# Patient Record
Sex: MALE | Race: BLACK OR AFRICAN AMERICAN | NOT HISPANIC OR LATINO
[De-identification: names, ages, dates, MRNs, and addresses within clinical notes are randomized per-mention and may not be internally consistent; named-entity substitution may affect disease eponyms.]

---

## 2017-01-18 ENCOUNTER — CHARTING TRANS (OUTPATIENT)
Dept: OTHER | Age: 57
End: 2017-01-18

## 2017-01-24 ENCOUNTER — HOSPITAL (OUTPATIENT)
Dept: OTHER | Age: 57
End: 2017-01-24
Attending: SPECIALIST

## 2017-01-24 LAB
ALBUMIN SERPL-MCNC: 3.4 GM/DL (ref 3.6–5.1)
ALP SERPL-CCNC: 138 UNIT/L (ref 45–117)
ALT SERPL-CCNC: 29 UNIT/L
ANALYZER ANC (IANC): ABNORMAL
ANION GAP SERPL CALC-SCNC: 10 MMOL/L (ref 10–20)
AST SERPL-CCNC: 17 UNIT/L
BASOPHILS # BLD: 0 THOUSAND/MCL (ref 0–0.3)
BASOPHILS NFR BLD: 0 %
BILIRUB CONJ SERPL-MCNC: 0.1 MG/DL (ref 0–0.2)
BILIRUB SERPL-MCNC: 0.2 MG/DL (ref 0.2–1)
BUN SERPL-MCNC: 14 MG/DL (ref 10–20)
BUN/CREAT SERPL: 15 (ref 7–25)
CALCIUM SERPL-MCNC: 8.6 MG/DL (ref 8.4–10.2)
CHLORIDE: 104 MMOL/L (ref 98–107)
CO2 SERPL-SCNC: 27 MMOL/L (ref 21–32)
CREAT SERPL-MCNC: 0.94 MG/DL (ref 0.67–1.17)
DIFFERENTIAL METHOD BLD: ABNORMAL
EOSINOPHIL # BLD: 0.3 THOUSAND/MCL (ref 0.1–0.5)
EOSINOPHIL NFR BLD: 4 %
ERYTHROCYTE [DISTWIDTH] IN BLOOD: 14.4 % (ref 11–15)
GLUCOSE SERPL-MCNC: 109 MG/DL (ref 65–99)
GLYCOHEMOGLOBIN: 7.4 % (ref 4.5–5.6)
HEMATOCRIT: 38.8 % (ref 39–51)
HGB BLD-MCNC: 12.5 GM/DL (ref 13–17)
LYMPHOCYTES # BLD: 3.3 THOUSAND/MCL (ref 1–4)
LYMPHOCYTES NFR BLD: 40 %
MAGNESIUM SERPL-MCNC: 1.9 MG/DL (ref 1.7–2.4)
MCH RBC QN AUTO: 26.6 PG (ref 26–34)
MCHC RBC AUTO-ENTMCNC: 32.2 GM/DL (ref 32–36.5)
MCV RBC AUTO: 82.6 FL (ref 78–100)
MONOCYTES # BLD: 0.8 THOUSAND/MCL (ref 0.3–0.9)
MONOCYTES NFR BLD: 10 %
NEUTROPHILS # BLD: 3.8 THOUSAND/MCL (ref 1.8–7.7)
NEUTROPHILS NFR BLD: 46 %
NEUTS SEG NFR BLD: ABNORMAL %
PERCENT NRBC: ABNORMAL
PLATELET # BLD: 258 THOUSAND/MCL (ref 140–450)
POTASSIUM SERPL-SCNC: 4.3 MMOL/L (ref 3.4–5.1)
PROT SERPL-MCNC: 7 GM/DL (ref 6.4–8.2)
RBC # BLD: 4.7 MILLION/MCL (ref 4.5–5.9)
SODIUM SERPL-SCNC: 137 MMOL/L (ref 135–145)
TSH SERPL-ACNC: 2.92 MCUNIT/ML (ref 0.35–5)
WBC # BLD: 8.3 THOUSAND/MCL (ref 4.2–11)

## 2017-02-01 ENCOUNTER — HOSPITAL (OUTPATIENT)
Dept: OTHER | Age: 57
End: 2017-02-01
Attending: SPECIALIST

## 2017-03-01 ENCOUNTER — HOSPITAL (OUTPATIENT)
Dept: OTHER | Age: 57
End: 2017-03-01
Attending: SPECIALIST

## 2017-03-10 LAB
ANION GAP SERPL CALC-SCNC: 8 MMOL/L (ref 10–20)
BUN SERPL-MCNC: 15 MG/DL (ref 6–20)
BUN/CREAT SERPL: 16 (ref 7–25)
CALCIUM SERPL-MCNC: 8.9 MG/DL (ref 8.4–10.2)
CHLORIDE: 103 MMOL/L (ref 98–107)
CO2 SERPL-SCNC: 29 MMOL/L (ref 21–32)
CREAT SERPL-MCNC: 0.92 MG/DL (ref 0.67–1.17)
GLUCOSE SERPL-MCNC: 220 MG/DL (ref 65–99)
MAGNESIUM SERPL-MCNC: 2 MG/DL (ref 1.7–2.4)
POTASSIUM SERPL-SCNC: 3.9 MMOL/L (ref 3.4–5.1)
SODIUM SERPL-SCNC: 136 MMOL/L (ref 135–145)

## 2017-04-01 ENCOUNTER — HOSPITAL (OUTPATIENT)
Dept: OTHER | Age: 57
End: 2017-04-01
Attending: SPECIALIST

## 2017-04-20 ENCOUNTER — HOSPITAL (OUTPATIENT)
Dept: OTHER | Age: 57
End: 2017-04-20
Attending: SPECIALIST

## 2017-04-20 LAB
ALBUMIN SERPL-MCNC: 3.6 GM/DL (ref 3.6–5.1)
ALBUMIN/GLOB SERPL: 0.8 {RATIO} (ref 1–2.4)
ALP SERPL-CCNC: 189 UNIT/L (ref 45–117)
ALT SERPL-CCNC: 34 UNIT/L
ANALYZER ANC (IANC): NORMAL
ANION GAP SERPL CALC-SCNC: 13 MMOL/L (ref 10–20)
APPEARANCE UR: CLEAR
AST SERPL-CCNC: 17 UNIT/L
B-OH-BUTYR SERPL-SCNC: 0.3 MMOL/L (ref 0–0.3)
BACTERIA #/AREA URNS HPF: ABNORMAL /HPF
BASOPHILS # BLD: 0 THOUSAND/MCL (ref 0–0.3)
BASOPHILS NFR BLD: 0 %
BILIRUB SERPL-MCNC: 0.3 MG/DL (ref 0.2–1)
BILIRUB UR QL: NEGATIVE
BNP SERPL-MCNC: 9 PG/ML
BUN SERPL-MCNC: 28 MG/DL (ref 6–20)
BUN/CREAT SERPL: 23 (ref 7–25)
CALCIUM SERPL-MCNC: 9.4 MG/DL (ref 8.4–10.2)
CHLORIDE: 96 MMOL/L (ref 98–107)
CO2 SERPL-SCNC: 28 MMOL/L (ref 21–32)
COLOR UR: YELLOW
CREAT SERPL-MCNC: 1.2 MG/DL (ref 0.67–1.17)
DIFFERENTIAL METHOD BLD: NORMAL
EOSINOPHIL # BLD: 0.2 THOUSAND/MCL (ref 0.1–0.5)
EOSINOPHIL NFR BLD: 2 %
ERYTHROCYTE [DISTWIDTH] IN BLOOD: 13.5 % (ref 11–15)
GLOBULIN SER-MCNC: 4.3 GM/DL (ref 2–4)
GLUCOSE BLDC GLUCOMTR-MCNC: 416 MG/DL (ref 65–99)
GLUCOSE BLDC GLUCOMTR-MCNC: 417 MG/DL (ref 65–99)
GLUCOSE SERPL-MCNC: 585 MG/DL (ref 65–99)
GLUCOSE UR-MCNC: >1000 MG/DL
GLYCOHEMOGLOBIN: 10.2 % (ref 4.5–5.6)
HEMATOCRIT: 41.2 % (ref 39–51)
HGB BLD-MCNC: 13.7 GM/DL (ref 13–17)
HGB UR QL: NEGATIVE
HYALINE CASTS #/AREA URNS LPF: ABNORMAL /LPF (ref 0–5)
KETONES UR-MCNC: NEGATIVE MG/DL
LEUKOCYTE ESTERASE UR QL STRIP: NEGATIVE
LYMPHOCYTES # BLD: 2.9 THOUSAND/MCL (ref 1–4)
LYMPHOCYTES NFR BLD: 28 %
MAGNESIUM SERPL-MCNC: 2.4 MG/DL (ref 1.7–2.4)
MCH RBC QN AUTO: 26.8 PG (ref 26–34)
MCHC RBC AUTO-ENTMCNC: 33.3 GM/DL (ref 32–36.5)
MCV RBC AUTO: 80.6 FL (ref 78–100)
MONOCYTES # BLD: 0.7 THOUSAND/MCL (ref 0.3–0.9)
MONOCYTES NFR BLD: 7 %
NEUTROPHILS # BLD: 6.5 THOUSAND/MCL (ref 1.8–7.7)
NEUTROPHILS NFR BLD: 63 %
NEUTS SEG NFR BLD: NORMAL %
NITRITE UR QL: NEGATIVE
OSMOLALITY UR: 556 MOSM/KG (ref 50–1200)
PERCENT NRBC: NORMAL
PH UR: 5.5 UNIT (ref 5–7)
PLATELET # BLD: 253 THOUSAND/MCL (ref 140–450)
POTASSIUM SERPL-SCNC: 4.7 MMOL/L (ref 3.4–5.1)
POTASSIUM UR-SCNC: 13.3 MMOL/L
PROT SERPL-MCNC: 7.9 GM/DL (ref 6.4–8.2)
PROT UR QL: NEGATIVE MG/DL
RBC # BLD: 5.11 MILLION/MCL (ref 4.5–5.9)
RBC #/AREA URNS HPF: ABNORMAL /HPF (ref 0–3)
SODIUM SERPL-SCNC: 132 MMOL/L (ref 135–145)
SODIUM UR-SCNC: 43 MMOL/L
SP GR UR: 1.03 (ref 1–1.03)
SPECIMEN SOURCE: ABNORMAL
SQUAMOUS #/AREA URNS HPF: ABNORMAL /HPF (ref 0–5)
URNS CMNT MICRO: ABNORMAL
UROBILINOGEN UR QL: 0.2 MG/DL (ref 0–1)
WBC # BLD: 10.3 THOUSAND/MCL (ref 4.2–11)
WBC #/AREA URNS HPF: ABNORMAL /HPF (ref 0–5)

## 2017-04-21 LAB
ANION GAP SERPL CALC-SCNC: 13 MMOL/L (ref 10–20)
BUN SERPL-MCNC: 21 MG/DL (ref 6–20)
BUN/CREAT SERPL: 22 (ref 7–25)
CALCIUM SERPL-MCNC: 9 MG/DL (ref 8.4–10.2)
CHLORIDE: 100 MMOL/L (ref 98–107)
CO2 SERPL-SCNC: 28 MMOL/L (ref 21–32)
CREAT SERPL-MCNC: 0.95 MG/DL (ref 0.67–1.17)
GLUCOSE BLDC GLUCOMTR-MCNC: 238 MG/DL (ref 65–99)
GLUCOSE BLDC GLUCOMTR-MCNC: 288 MG/DL (ref 65–99)
GLUCOSE BLDC GLUCOMTR-MCNC: 328 MG/DL (ref 65–99)
GLUCOSE BLDC GLUCOMTR-MCNC: 338 MG/DL (ref 65–99)
GLUCOSE BLDC GLUCOMTR-MCNC: 383 MG/DL (ref 65–99)
GLUCOSE BLDC GLUCOMTR-MCNC: 387 MG/DL (ref 65–99)
GLUCOSE SERPL-MCNC: 352 MG/DL (ref 65–99)
POTASSIUM SERPL-SCNC: 4.6 MMOL/L (ref 3.4–5.1)
SODIUM SERPL-SCNC: 136 MMOL/L (ref 135–145)

## 2017-04-22 LAB
GLUCOSE BLDC GLUCOMTR-MCNC: 180 MG/DL (ref 65–99)
GLUCOSE BLDC GLUCOMTR-MCNC: 239 MG/DL (ref 65–99)
GLUCOSE BLDC GLUCOMTR-MCNC: 318 MG/DL (ref 65–99)

## 2017-04-26 ENCOUNTER — CHARTING TRANS (OUTPATIENT)
Dept: OTHER | Age: 57
End: 2017-04-26

## 2017-04-26 ENCOUNTER — LAB SERVICES (OUTPATIENT)
Dept: OTHER | Age: 57
End: 2017-04-26

## 2017-04-26 LAB — GLUCOSE: 158

## 2017-05-01 ENCOUNTER — HOSPITAL (OUTPATIENT)
Dept: OTHER | Age: 57
End: 2017-05-01
Attending: SPECIALIST

## 2017-05-18 ENCOUNTER — CHARTING TRANS (OUTPATIENT)
Dept: OTHER | Age: 57
End: 2017-05-18

## 2017-05-18 ENCOUNTER — LAB SERVICES (OUTPATIENT)
Dept: OTHER | Age: 57
End: 2017-05-18

## 2017-05-18 LAB
ALBUMIN SERPL-MCNC: 2.9 GM/DL (ref 3.6–5.1)
ALBUMIN/GLOB SERPL: 0.7 {RATIO} (ref 1–2.4)
ALP SERPL-CCNC: 94 UNIT/L (ref 45–117)
ALT SERPL-CCNC: 29 UNIT/L
ANION GAP SERPL CALC-SCNC: 14 MMOL/L (ref 10–20)
AST SERPL-CCNC: 18 UNIT/L
BILIRUB SERPL-MCNC: 0.4 MG/DL (ref 0.2–1)
BUN SERPL-MCNC: 14 MG/DL (ref 6–20)
BUN/CREAT SERPL: 14 (ref 7–25)
CALCIUM SERPL-MCNC: 8.6 MG/DL (ref 8.4–10.2)
CHLORIDE: 106 MMOL/L (ref 98–107)
CO2 SERPL-SCNC: 26 MMOL/L (ref 21–32)
CREAT SERPL-MCNC: 0.97 MG/DL (ref 0.67–1.17)
GLOBULIN SER-MCNC: 4 GM/DL (ref 2–4)
GLUCOSE SERPL-MCNC: 79 MG/DL (ref 65–99)
MAGNESIUM SERPL-MCNC: 2 MG/DL (ref 1.7–2.4)
POTASSIUM SERPL-SCNC: 3.8 MMOL/L (ref 3.4–5.1)
PROT SERPL-MCNC: 6.9 GM/DL (ref 6.4–8.2)
SODIUM SERPL-SCNC: 142 MMOL/L (ref 135–145)
TSH SERPL-ACNC: 0.99 MCUNIT/ML (ref 0.35–5)
TSH SERPL-ACNC: 0.99 MCUNITS/ML (ref 0.35–5)

## 2017-06-01 ENCOUNTER — HOSPITAL (OUTPATIENT)
Dept: OTHER | Age: 57
End: 2017-06-01
Attending: SPECIALIST

## 2017-06-12 ENCOUNTER — LAB SERVICES (OUTPATIENT)
Dept: OTHER | Age: 57
End: 2017-06-12

## 2017-06-12 ENCOUNTER — CHARTING TRANS (OUTPATIENT)
Dept: OTHER | Age: 57
End: 2017-06-12

## 2017-06-12 LAB — GLUCOSE: 105

## 2017-07-01 ENCOUNTER — HOSPITAL (OUTPATIENT)
Dept: OTHER | Age: 57
End: 2017-07-01
Attending: SPECIALIST

## 2017-07-05 LAB
ANALYZER ANC (IANC): ABNORMAL
ANION GAP SERPL CALC-SCNC: 12 MMOL/L (ref 10–20)
BASOPHILS # BLD: 0 THOUSAND/MCL (ref 0–0.3)
BASOPHILS NFR BLD: 0 %
BUN SERPL-MCNC: 20 MG/DL (ref 6–20)
BUN/CREAT SERPL: 16 (ref 7–25)
CALCIUM SERPL-MCNC: 9 MG/DL (ref 8.4–10.2)
CHLORIDE: 109 MMOL/L (ref 98–107)
CO2 SERPL-SCNC: 25 MMOL/L (ref 21–32)
CREAT SERPL-MCNC: 1.27 MG/DL (ref 0.67–1.17)
DIFFERENTIAL METHOD BLD: ABNORMAL
EOSINOPHIL # BLD: 0.2 THOUSAND/MCL (ref 0.1–0.5)
EOSINOPHIL NFR BLD: 2 %
ERYTHROCYTE [DISTWIDTH] IN BLOOD: 14 % (ref 11–15)
GLUCOSE SERPL-MCNC: 113 MG/DL (ref 65–99)
HEMATOCRIT: 38.3 % (ref 39–51)
HGB BLD-MCNC: 12.2 GM/DL (ref 13–17)
LYMPHOCYTES # BLD: 2.4 THOUSAND/MCL (ref 1–4)
LYMPHOCYTES NFR BLD: 33 %
MAGNESIUM SERPL-MCNC: 2.1 MG/DL (ref 1.7–2.4)
MCH RBC QN AUTO: 25.8 PG (ref 26–34)
MCHC RBC AUTO-ENTMCNC: 31.9 GM/DL (ref 32–36.5)
MCV RBC AUTO: 81 FL (ref 78–100)
MONOCYTES # BLD: 0.7 THOUSAND/MCL (ref 0.3–0.9)
MONOCYTES NFR BLD: 9 %
NEUTROPHILS # BLD: 4.1 THOUSAND/MCL (ref 1.8–7.7)
NEUTROPHILS NFR BLD: 56 %
NEUTS SEG NFR BLD: ABNORMAL %
PERCENT NRBC: ABNORMAL
PLATELET # BLD: 232 THOUSAND/MCL (ref 140–450)
POTASSIUM SERPL-SCNC: 3.9 MMOL/L (ref 3.4–5.1)
RBC # BLD: 4.73 MILLION/MCL (ref 4.5–5.9)
SODIUM SERPL-SCNC: 142 MMOL/L (ref 135–145)
WBC # BLD: 7.4 THOUSAND/MCL (ref 4.2–11)

## 2018-02-22 ENCOUNTER — HOSPITAL ENCOUNTER (OUTPATIENT)
Dept: GENERAL RADIOLOGY | Age: 58
Discharge: HOME | End: 2018-02-22
Attending: FAMILY MEDICINE
Payer: COMMERCIAL

## 2018-02-22 ENCOUNTER — OFFICE VISIT (OUTPATIENT)
Dept: FAMILY MEDICINE CLINIC | Facility: CLINIC | Age: 58
End: 2018-02-22

## 2018-02-22 ENCOUNTER — LAB ENCOUNTER (OUTPATIENT)
Dept: LAB | Age: 58
End: 2018-02-22
Attending: FAMILY MEDICINE
Payer: COMMERCIAL

## 2018-02-22 VITALS
TEMPERATURE: 99 F | WEIGHT: 315 LBS | HEART RATE: 59 BPM | BODY MASS INDEX: 40.43 KG/M2 | DIASTOLIC BLOOD PRESSURE: 84 MMHG | HEIGHT: 74 IN | RESPIRATION RATE: 16 BRPM | SYSTOLIC BLOOD PRESSURE: 144 MMHG

## 2018-02-22 DIAGNOSIS — Z00.00 ROUTINE PHYSICAL EXAMINATION: ICD-10-CM

## 2018-02-22 DIAGNOSIS — Z13.29 THYROID DISORDER SCREEN: ICD-10-CM

## 2018-02-22 DIAGNOSIS — E66.01 MORBID OBESITY WITH BMI OF 40.0-44.9, ADULT (HCC): ICD-10-CM

## 2018-02-22 DIAGNOSIS — M54.50 CHRONIC LEFT-SIDED LOW BACK PAIN WITHOUT SCIATICA: ICD-10-CM

## 2018-02-22 DIAGNOSIS — Z13.220 LIPID SCREENING: ICD-10-CM

## 2018-02-22 DIAGNOSIS — G89.29 CHRONIC LEFT-SIDED LOW BACK PAIN WITHOUT SCIATICA: ICD-10-CM

## 2018-02-22 DIAGNOSIS — I42.9 CARDIOMYOPATHY, UNSPECIFIED TYPE (HCC): ICD-10-CM

## 2018-02-22 DIAGNOSIS — I10 ESSENTIAL HYPERTENSION: ICD-10-CM

## 2018-02-22 DIAGNOSIS — I49.9 CARDIAC ARRHYTHMIA, UNSPECIFIED CARDIAC ARRHYTHMIA TYPE: ICD-10-CM

## 2018-02-22 DIAGNOSIS — R05.9 COUGH: ICD-10-CM

## 2018-02-22 DIAGNOSIS — R10.9 LEFT FLANK PAIN: ICD-10-CM

## 2018-02-22 DIAGNOSIS — Z12.5 PROSTATE CANCER SCREENING: Primary | ICD-10-CM

## 2018-02-22 DIAGNOSIS — Z12.5 PROSTATE CANCER SCREENING: ICD-10-CM

## 2018-02-22 DIAGNOSIS — R10.9 FLANK PAIN: ICD-10-CM

## 2018-02-22 LAB
ALBUMIN SERPL BCP-MCNC: 3.4 G/DL (ref 3.5–4.8)
ALBUMIN/GLOB SERPL: 0.9 {RATIO} (ref 1–2)
ALP SERPL-CCNC: 95 U/L (ref 32–100)
ALT SERPL-CCNC: 30 U/L (ref 17–63)
ANION GAP SERPL CALC-SCNC: 8 MMOL/L (ref 0–18)
AST SERPL-CCNC: 22 U/L (ref 15–41)
BACTERIA UR QL AUTO: NEGATIVE /HPF
BASOPHILS # BLD: 0.1 K/UL (ref 0–0.2)
BASOPHILS NFR BLD: 1 %
BILIRUB SERPL-MCNC: 0.5 MG/DL (ref 0.3–1.2)
BILIRUB UR QL: NEGATIVE
BUN SERPL-MCNC: 10 MG/DL (ref 8–20)
BUN/CREAT SERPL: 9.5 (ref 10–20)
CALCIUM SERPL-MCNC: 9 MG/DL (ref 8.5–10.5)
CHLORIDE SERPL-SCNC: 102 MMOL/L (ref 95–110)
CHOLEST SERPL-MCNC: 162 MG/DL (ref 110–200)
CLARITY UR: CLEAR
CO2 SERPL-SCNC: 26 MMOL/L (ref 22–32)
COLOR UR: YELLOW
CREAT SERPL-MCNC: 1.05 MG/DL (ref 0.5–1.5)
EOSINOPHIL # BLD: 0.2 K/UL (ref 0–0.7)
EOSINOPHIL NFR BLD: 3 %
ERYTHROCYTE [DISTWIDTH] IN BLOOD BY AUTOMATED COUNT: 14.3 % (ref 11–15)
GLOBULIN PLAS-MCNC: 3.7 G/DL (ref 2.5–3.7)
GLUCOSE SERPL-MCNC: 328 MG/DL (ref 70–99)
GLUCOSE UR-MCNC: >=500 MG/DL
HCT VFR BLD AUTO: 39.7 % (ref 41–52)
HDLC SERPL-MCNC: 35 MG/DL
HGB BLD-MCNC: 13 G/DL (ref 13.5–17.5)
HGB UR QL STRIP.AUTO: NEGATIVE
KETONES UR-MCNC: NEGATIVE MG/DL
LDLC SERPL CALC-MCNC: 104 MG/DL (ref 0–99)
LEUKOCYTE ESTERASE UR QL STRIP.AUTO: NEGATIVE
LYMPHOCYTES # BLD: 2.2 K/UL (ref 1–4)
LYMPHOCYTES NFR BLD: 32 %
MCH RBC QN AUTO: 26.8 PG (ref 27–32)
MCHC RBC AUTO-ENTMCNC: 32.7 G/DL (ref 32–37)
MCV RBC AUTO: 81.9 FL (ref 80–100)
MONOCYTES # BLD: 0.7 K/UL (ref 0–1)
MONOCYTES NFR BLD: 10 %
NEUTROPHILS # BLD AUTO: 3.8 K/UL (ref 1.8–7.7)
NEUTROPHILS NFR BLD: 55 %
NITRITE UR QL STRIP.AUTO: NEGATIVE
NONHDLC SERPL-MCNC: 127 MG/DL
OSMOLALITY UR CALC.SUM OF ELEC: 294 MOSM/KG (ref 275–295)
PATIENT FASTING: NO
PH UR: 5 [PH] (ref 5–8)
PLATELET # BLD AUTO: 209 K/UL (ref 140–400)
PMV BLD AUTO: 8.9 FL (ref 7.4–10.3)
POTASSIUM SERPL-SCNC: 4.3 MMOL/L (ref 3.3–5.1)
PROT SERPL-MCNC: 7.1 G/DL (ref 5.9–8.4)
PROT UR-MCNC: NEGATIVE MG/DL
PSA SERPL-MCNC: 5 NG/ML (ref 0–4)
RBC # BLD AUTO: 4.85 M/UL (ref 4.5–5.9)
RBC #/AREA URNS AUTO: <1 /HPF
SODIUM SERPL-SCNC: 136 MMOL/L (ref 136–144)
SP GR UR STRIP: 1.02 (ref 1–1.03)
TRIGL SERPL-MCNC: 117 MG/DL (ref 1–149)
TSH SERPL-ACNC: 0.77 UIU/ML (ref 0.45–5.33)
UROBILINOGEN UR STRIP-ACNC: <2
VIT C UR-MCNC: NEGATIVE MG/DL
WBC # BLD AUTO: 7 K/UL (ref 4–11)
WBC #/AREA URNS AUTO: <1 /HPF

## 2018-02-22 PROCEDURE — 36415 COLL VENOUS BLD VENIPUNCTURE: CPT

## 2018-02-22 PROCEDURE — 99396 PREV VISIT EST AGE 40-64: CPT | Performed by: FAMILY MEDICINE

## 2018-02-22 PROCEDURE — 71046 X-RAY EXAM CHEST 2 VIEWS: CPT | Performed by: FAMILY MEDICINE

## 2018-02-22 PROCEDURE — 84443 ASSAY THYROID STIM HORMONE: CPT

## 2018-02-22 PROCEDURE — 72110 X-RAY EXAM L-2 SPINE 4/>VWS: CPT | Performed by: FAMILY MEDICINE

## 2018-02-22 PROCEDURE — 87086 URINE CULTURE/COLONY COUNT: CPT

## 2018-02-22 PROCEDURE — 80061 LIPID PANEL: CPT

## 2018-02-22 PROCEDURE — 85025 COMPLETE CBC W/AUTO DIFF WBC: CPT

## 2018-02-22 PROCEDURE — 81001 URINALYSIS AUTO W/SCOPE: CPT

## 2018-02-22 PROCEDURE — 99212 OFFICE O/P EST SF 10 MIN: CPT | Performed by: FAMILY MEDICINE

## 2018-02-22 PROCEDURE — 99214 OFFICE O/P EST MOD 30 MIN: CPT | Performed by: FAMILY MEDICINE

## 2018-02-22 PROCEDURE — 80053 COMPREHEN METABOLIC PANEL: CPT

## 2018-02-22 RX ORDER — DIGOXIN 250 MCG
TABLET ORAL
COMMUNITY
Start: 2018-02-18 | End: 2019-09-19

## 2018-02-22 RX ORDER — CARVEDILOL 25 MG/1
25 TABLET ORAL 2 TIMES DAILY
COMMUNITY
Start: 2018-02-18

## 2018-02-22 NOTE — PATIENT INSTRUCTIONS
Monitor blood pressures and record at home. Limit salt intake. Recommend weight loss via daily exercising and consistent healthy dietary changes. Medication reviewed and renewed where needed and appropriate. Comply with medications.   Keep specialist jeny

## 2018-02-23 ENCOUNTER — TELEPHONE (OUTPATIENT)
Dept: FAMILY MEDICINE CLINIC | Facility: CLINIC | Age: 58
End: 2018-02-23

## 2018-02-23 NOTE — TELEPHONE ENCOUNTER
Verified name and . X Cheryle Clara Results reviewed with pt. Pt also asking if Dr Lucy Diehl can review his labs from 18. Please note, pt did indicate he did not fast for labs, per pt he ate a piece of cake a couple hours before lab draw.

## 2018-02-23 NOTE — TELEPHONE ENCOUNTER
----- Message from Leonor Rahman DO sent at 2/22/2018  8:14 PM CST -----  Chest xray is normal but there is multilevel disc and bone degenerative changes. Moderate osteoarthritis in both hips.

## 2018-02-23 NOTE — PROGRESS NOTES
HPI:    Patient ID: Rocio Cruz is a 56year old male.     56year old AA male here for complete preventive care physical and for status update on any confirmed chronic medical illnesses and follow up on any previous labs or procedures that were suggestiv Body mass index is 43.4 kg/m². PHYSICAL EXAM:   Physical Exam    Constitutional: He is oriented to person, place, and time and obese. He appears distressed.    HENT:   Right Ear: Tympanic membrane and ear canal normal.   Left Ear: Tympanic membrane and e CBC W Differential W Platelet [E]      TSH W Reflex To Free T4 [E]      Lipid Panel [E]      Comp Metabolic Panel (14) [E]      PSA (Screening) [E]      Urine Culture, Routine [E]    Meds This Visit:  No prescriptions requested or ordered in this encou

## 2018-02-26 NOTE — TELEPHONE ENCOUNTER
Notes Recorded by Pam Blanchard DO on 2/23/2018 at 4:18 PM CST  Blood sugar grossly elevated (has nothing to do with non fasting status). A1c to be sent off of sample. Will need ASAP appointment with endocrine. Referral on chart. Call patient.   Note

## 2018-02-26 NOTE — TELEPHONE ENCOUNTER
Patient contacted. Given the information regarding referral. Patient states he has an endocrinologist at [address] [place] [address], Dr. Belen Pimentel. He will follow up with her. Patient verbalized understanding of need for Hgb A1c and intent to comply.  I asked if he need us to f

## 2018-03-13 ENCOUNTER — HOSPITAL (OUTPATIENT)
Dept: OTHER | Age: 58
End: 2018-03-13
Attending: SPECIALIST

## 2018-03-13 LAB
ALBUMIN SERPL-MCNC: 3.1 GM/DL (ref 3.6–5.1)
ALBUMIN/GLOB SERPL: 0.8 {RATIO} (ref 1–2.4)
ALP SERPL-CCNC: 127 UNIT/L (ref 45–117)
ALT SERPL-CCNC: 38 UNIT/L
ANALYZER ANC (IANC): ABNORMAL
ANION GAP SERPL CALC-SCNC: 10 MMOL/L (ref 10–20)
AST SERPL-CCNC: 22 UNIT/L
BASOPHILS # BLD: 0 THOUSAND/MCL (ref 0–0.3)
BASOPHILS NFR BLD: 0 %
BILIRUB SERPL-MCNC: 0.3 MG/DL (ref 0.2–1)
BNP SERPL-MCNC: 80 PG/ML
BUN SERPL-MCNC: 15 MG/DL (ref 6–20)
BUN/CREAT SERPL: 13 (ref 7–25)
CALCIUM SERPL-MCNC: 8.7 MG/DL (ref 8.4–10.2)
CHLORIDE: 101 MMOL/L (ref 98–107)
CO2 SERPL-SCNC: 28 MMOL/L (ref 21–32)
CREAT SERPL-MCNC: 1.13 MG/DL (ref 0.67–1.17)
DIFFERENTIAL METHOD BLD: ABNORMAL
EOSINOPHIL # BLD: 0.2 THOUSAND/MCL (ref 0.1–0.5)
EOSINOPHIL NFR BLD: 3 %
ERYTHROCYTE [DISTWIDTH] IN BLOOD: 13.5 % (ref 11–15)
GLOBULIN SER-MCNC: 4 GM/DL (ref 2–4)
GLUCOSE SERPL-MCNC: 287 MG/DL (ref 65–99)
GLYCOHEMOGLOBIN: 10.6 % (ref 4.5–5.6)
HEMATOCRIT: 38.2 % (ref 39–51)
HGB BLD-MCNC: 12.5 GM/DL (ref 13–17)
LYMPHOCYTES # BLD: 2.3 THOUSAND/MCL (ref 1–4)
LYMPHOCYTES NFR BLD: 34 %
MAGNESIUM SERPL-MCNC: 1.9 MG/DL (ref 1.7–2.4)
MCH RBC QN AUTO: 26.3 PG (ref 26–34)
MCHC RBC AUTO-ENTMCNC: 32.7 GM/DL (ref 32–36.5)
MCV RBC AUTO: 80.3 FL (ref 78–100)
MONOCYTES # BLD: 0.7 THOUSAND/MCL (ref 0.3–0.9)
MONOCYTES NFR BLD: 11 %
NEUTROPHILS # BLD: 3.5 THOUSAND/MCL (ref 1.8–7.7)
NEUTROPHILS NFR BLD: 52 %
NEUTS SEG NFR BLD: ABNORMAL %
PERCENT NRBC: ABNORMAL
PLATELET # BLD: 228 THOUSAND/MCL (ref 140–450)
POTASSIUM SERPL-SCNC: 4 MMOL/L (ref 3.4–5.1)
PROT SERPL-MCNC: 7.1 GM/DL (ref 6.4–8.2)
RBC # BLD: 4.76 MILLION/MCL (ref 4.5–5.9)
SODIUM SERPL-SCNC: 135 MMOL/L (ref 135–145)
TSH SERPL-ACNC: 0.65 MCUNIT/ML (ref 0.35–5)
WBC # BLD: 6.8 THOUSAND/MCL (ref 4.2–11)

## 2018-03-16 ENCOUNTER — HOSPITAL (OUTPATIENT)
Dept: OTHER | Age: 58
End: 2018-03-16
Attending: SPECIALIST

## 2018-03-16 LAB
CHOLEST SERPL-MCNC: 163 MG/DL
CHOLEST/HDLC SERPL: 3.5 {RATIO}
HDLC SERPL-MCNC: 46 MG/DL
LDLC SERPL CALC-MCNC: 99 MG/DL
NONHDLC SERPL-MCNC: 117 MG/DL
TRIGLYCERIDE (TRIGP): 90 MG/DL

## 2018-04-01 ENCOUNTER — HOSPITAL (OUTPATIENT)
Dept: OTHER | Age: 58
End: 2018-04-01
Attending: SPECIALIST

## 2018-04-10 LAB
ANION GAP SERPL CALC-SCNC: 14 MMOL/L (ref 10–20)
BUN SERPL-MCNC: 19 MG/DL (ref 6–20)
BUN/CREAT SERPL: 18 (ref 7–25)
CALCIUM SERPL-MCNC: 9.3 MG/DL (ref 8.4–10.2)
CHLORIDE: 103 MMOL/L (ref 98–107)
CO2 SERPL-SCNC: 26 MMOL/L (ref 21–32)
CREAT SERPL-MCNC: 1.03 MG/DL (ref 0.67–1.17)
GLUCOSE SERPL-MCNC: 376 MG/DL (ref 65–99)
MAGNESIUM SERPL-MCNC: 2.2 MG/DL (ref 1.7–2.4)
POTASSIUM SERPL-SCNC: 4.4 MMOL/L (ref 3.4–5.1)
SODIUM SERPL-SCNC: 139 MMOL/L (ref 135–145)

## 2018-04-27 ENCOUNTER — CHARTING TRANS (OUTPATIENT)
Dept: OTHER | Age: 58
End: 2018-04-27

## 2018-04-27 ENCOUNTER — LAB SERVICES (OUTPATIENT)
Dept: OTHER | Age: 58
End: 2018-04-27

## 2018-04-27 LAB — GLUCOSE: 281

## 2018-05-01 ENCOUNTER — HOSPITAL (OUTPATIENT)
Dept: OTHER | Age: 58
End: 2018-05-01
Attending: SPECIALIST

## 2018-05-18 LAB
ANION GAP SERPL CALC-SCNC: 11 MMOL/L (ref 10–20)
BUN SERPL-MCNC: 14 MG/DL (ref 6–20)
BUN/CREAT SERPL: 15 (ref 7–25)
CALCIUM SERPL-MCNC: 9.1 MG/DL (ref 8.4–10.2)
CHLORIDE: 104 MMOL/L (ref 98–107)
CO2 SERPL-SCNC: 27 MMOL/L (ref 21–32)
CREAT SERPL-MCNC: 0.91 MG/DL (ref 0.67–1.17)
GLUCOSE SERPL-MCNC: 202 MG/DL (ref 65–99)
HEMATOCRIT: 40.1 % (ref 39–51)
HGB BLD-MCNC: 13 GM/DL (ref 13–17)
MAGNESIUM SERPL-MCNC: 1.8 MG/DL (ref 1.7–2.4)
POTASSIUM SERPL-SCNC: 4.1 MMOL/L (ref 3.4–5.1)
SODIUM SERPL-SCNC: 138 MMOL/L (ref 135–145)
URATE SERPL-MCNC: 7.8 MG/DL (ref 3.5–7.2)

## 2018-06-01 ENCOUNTER — HOSPITAL (OUTPATIENT)
Dept: OTHER | Age: 58
End: 2018-06-01
Attending: SPECIALIST

## 2018-06-29 ENCOUNTER — LAB SERVICES (OUTPATIENT)
Dept: OTHER | Age: 58
End: 2018-06-29

## 2018-06-29 LAB
ANION GAP SERPL CALC-SCNC: 10 MMOL/L (ref 10–20)
BUN SERPL-MCNC: 16 MG/DL (ref 6–20)
BUN/CREAT SERPL: 16 (ref 7–25)
CALCIUM SERPL-MCNC: 9.1 MG/DL (ref 8.4–10.2)
CHLORIDE: 107 MMOL/L (ref 98–107)
CO2 SERPL-SCNC: 28 MMOL/L (ref 21–32)
CREAT SERPL-MCNC: 1.01 MG/DL (ref 0.67–1.17)
GLUCOSE SERPL-MCNC: 108 MG/DL (ref 65–99)
GLYCOHEMOGLOBIN: 9.8 % (ref 4.5–5.6)
HEMATOCRIT: 38.7 % (ref 39–51)
HGB BLD-MCNC: 12.4 GM/DL (ref 13–17)
MAGNESIUM SERPL-MCNC: 2.1 MG/DL (ref 1.7–2.4)
POTASSIUM SERPL-SCNC: 4 MMOL/L (ref 3.4–5.1)
SODIUM SERPL-SCNC: 141 MMOL/L (ref 135–145)

## 2018-07-01 ENCOUNTER — HOSPITAL (OUTPATIENT)
Dept: OTHER | Age: 58
End: 2018-07-01
Attending: SPECIALIST

## 2018-07-01 ENCOUNTER — CHARTING TRANS (OUTPATIENT)
Dept: OTHER | Age: 58
End: 2018-07-01

## 2018-07-01 LAB — HBA1C MFR BLD: 9.8 % (ref 4.5–5.6)

## 2018-11-01 VITALS
DIASTOLIC BLOOD PRESSURE: 72 MMHG | SYSTOLIC BLOOD PRESSURE: 134 MMHG | WEIGHT: 315 LBS | HEART RATE: 71 BPM | BODY MASS INDEX: 40.43 KG/M2 | HEIGHT: 74 IN

## 2018-11-03 VITALS
DIASTOLIC BLOOD PRESSURE: 78 MMHG | HEIGHT: 74 IN | SYSTOLIC BLOOD PRESSURE: 114 MMHG | HEART RATE: 62 BPM | BODY MASS INDEX: 40.43 KG/M2 | WEIGHT: 315 LBS

## 2018-11-03 VITALS
DIASTOLIC BLOOD PRESSURE: 68 MMHG | BODY MASS INDEX: 40.43 KG/M2 | SYSTOLIC BLOOD PRESSURE: 138 MMHG | HEIGHT: 74 IN | WEIGHT: 315 LBS | HEART RATE: 68 BPM

## 2018-11-05 VITALS
DIASTOLIC BLOOD PRESSURE: 82 MMHG | HEART RATE: 64 BPM | HEIGHT: 74 IN | BODY MASS INDEX: 40.43 KG/M2 | SYSTOLIC BLOOD PRESSURE: 118 MMHG | WEIGHT: 315 LBS

## 2019-03-04 RX ORDER — INSULIN DETEMIR 100 [IU]/ML
INJECTION, SOLUTION SUBCUTANEOUS
Qty: 15 ML | Refills: 0 | Status: SHIPPED | OUTPATIENT
Start: 2019-03-04

## 2019-08-22 DIAGNOSIS — E11.9 TYPE 2 DIABETES MELLITUS WITHOUT COMPLICATION, WITHOUT LONG-TERM CURRENT USE OF INSULIN (HCC): Primary | ICD-10-CM

## 2019-08-22 RX ORDER — FLASH GLUCOSE SENSOR
KIT MISCELLANEOUS
Qty: 2 EACH | Refills: 5 | Status: SHIPPED | OUTPATIENT
Start: 2019-08-22

## 2019-09-19 ENCOUNTER — OFFICE VISIT (OUTPATIENT)
Dept: FAMILY MEDICINE CLINIC | Facility: CLINIC | Age: 59
End: 2019-09-19
Payer: COMMERCIAL

## 2019-09-19 VITALS
HEART RATE: 68 BPM | HEIGHT: 74 IN | DIASTOLIC BLOOD PRESSURE: 76 MMHG | BODY MASS INDEX: 36.74 KG/M2 | WEIGHT: 286.25 LBS | SYSTOLIC BLOOD PRESSURE: 110 MMHG

## 2019-09-19 DIAGNOSIS — I10 ESSENTIAL HYPERTENSION: ICD-10-CM

## 2019-09-19 DIAGNOSIS — Z12.5 PROSTATE CANCER SCREENING: Primary | ICD-10-CM

## 2019-09-19 DIAGNOSIS — R73.9 HYPERGLYCEMIA: ICD-10-CM

## 2019-09-19 DIAGNOSIS — Z13.220 LIPID SCREENING: ICD-10-CM

## 2019-09-19 DIAGNOSIS — Z95.810 PRESENCE OF COMBINATION INTERNAL CARDIAC DEFIBRILLATOR (ICD) AND PACEMAKER: ICD-10-CM

## 2019-09-19 DIAGNOSIS — Z00.00 ROUTINE PHYSICAL EXAMINATION: ICD-10-CM

## 2019-09-19 DIAGNOSIS — Z13.29 THYROID DISORDER SCREEN: ICD-10-CM

## 2019-09-19 DIAGNOSIS — Z12.11 COLON CANCER SCREENING: ICD-10-CM

## 2019-09-19 PROCEDURE — 99396 PREV VISIT EST AGE 40-64: CPT | Performed by: FAMILY MEDICINE

## 2019-09-19 PROCEDURE — 93000 ELECTROCARDIOGRAM COMPLETE: CPT | Performed by: FAMILY MEDICINE

## 2019-09-19 PROCEDURE — 99214 OFFICE O/P EST MOD 30 MIN: CPT | Performed by: FAMILY MEDICINE

## 2019-09-19 RX ORDER — DIGOXIN 250 MCG
250 TABLET ORAL DAILY
Qty: 90 TABLET | Refills: 1 | Status: SHIPPED | OUTPATIENT
Start: 2019-09-19

## 2019-09-19 NOTE — PROGRESS NOTES
HPI:    Patient ID: Law Avalos is a 64year old male.     64year old AA male here for complete preventive care physical and for status update on any confirmed chronic medical illnesses and follow up on any previous labs or procedures that were suggestiv Exam reveals no gallop. Murmur heard. Occasional beat from the defibrillator. Edema not present. Carotid bruit not present. Pulmonary/Chest: Effort normal and breath sounds normal. No respiratory distress.    Neurological: He is alert and oriented to This Visit:  Requested Prescriptions     Signed Prescriptions Disp Refills   • digoxin 0.25 MG Oral Tab 90 tablet 1     Sig: Take 1 tablet (250 mcg total) by mouth daily.        Imaging & Referrals:  ELECTROCARDIOGRAM, COMPLETE  GASTRO - INTERNAL  Patient I

## 2019-09-19 NOTE — PATIENT INSTRUCTIONS
All adult screening ordered and done appropriate for patient's age and gender and risk factors and complaints. Medication reviewed and renewed where needed and appropriate. Comply with medications.   Recommend weight loss via daily exercising and consiste

## 2019-12-04 RX ORDER — FLURBIPROFEN SODIUM 0.3 MG/ML
SOLUTION/ DROPS OPHTHALMIC
Refills: 7 | OUTPATIENT
Start: 2019-12-04

## 2020-01-01 ENCOUNTER — DIAGNOSTIC TRANS (OUTPATIENT)
Dept: OTHER | Age: 60
End: 2020-01-01

## 2020-01-01 ENCOUNTER — HOSPITAL (OUTPATIENT)
Dept: OTHER | Age: 60
End: 2020-01-01

## 2020-01-01 LAB
ABO + RH BLD: NORMAL
ALBUMIN SERPL-MCNC: 3.2 G/DL (ref 3.6–5.1)
ALBUMIN/GLOB SERPL: 0.7 {RATIO} (ref 1–2.4)
ALP SERPL-CCNC: 144 UNITS/L (ref 45–117)
ALT SERPL-CCNC: 16 UNITS/L
ANALYZER ANC (IANC): ABNORMAL
ANION GAP SERPL CALC-SCNC: 12 MMOL/L (ref 10–20)
APTT PPP: 29 SEC (ref 22–32)
APTT PPP: NORMAL S
APTT PPP: NORMAL S
AST SERPL-CCNC: 10 UNITS/L
BASOPHILS # BLD: 0 K/MCL (ref 0–0.3)
BASOPHILS NFR BLD: 1 %
BILIRUB SERPL-MCNC: 0.1 MG/DL (ref 0.2–1)
BLD GP AB SCN SERPL QL: NEGATIVE
BUN SERPL-MCNC: 14 MG/DL (ref 6–20)
BUN/CREAT SERPL: 14 (ref 7–25)
CALCIUM SERPL-MCNC: 8.5 MG/DL (ref 8.4–10.2)
CHLORIDE SERPL-SCNC: 100 MMOL/L (ref 98–107)
CO2 SERPL-SCNC: 25 MMOL/L (ref 21–32)
CREAT SERPL-MCNC: 1 MG/DL (ref 0.67–1.17)
CROSSMATCH EXPIRE: NORMAL
DIFFERENTIAL METHOD BLD: ABNORMAL
EOSINOPHIL # BLD: 0.1 K/MCL (ref 0.1–0.5)
EOSINOPHIL NFR BLD: 2 %
ERYTHROCYTE [DISTWIDTH] IN BLOOD: 12.9 % (ref 11–15)
GLOBULIN SER-MCNC: 4.7 G/DL (ref 2–4)
GLUCOSE BLDC GLUCOMTR-MCNC: 212 MG/DL (ref 70–99)
GLUCOSE BLDC GLUCOMTR-MCNC: 230 MG/DL (ref 70–99)
GLUCOSE BLDC GLUCOMTR-MCNC: 236 MG/DL (ref 70–99)
GLUCOSE BLDC GLUCOMTR-MCNC: 294 MG/DL (ref 70–99)
GLUCOSE BLDC GLUCOMTR-MCNC: 337 MG/DL (ref 70–99)
GLUCOSE BLDC GLUCOMTR-MCNC: 390 MG/DL (ref 70–99)
GLUCOSE BLDC GLUCOMTR-MCNC: 533 MG/DL (ref 70–99)
GLUCOSE BLDC GLUCOMTR-MCNC: ABNORMAL MG/DL
GLUCOSE SERPL-MCNC: 645 MG/DL (ref 65–99)
GLUCOSE SERPL-MCNC: ABNORMAL MG/DL
HCT VFR BLD CALC: 42.4 % (ref 39–51)
HGB BLD-MCNC: 13.5 G/DL (ref 13–17)
IMM GRANULOCYTES # BLD AUTO: 0 K/MCL (ref 0–0.2)
IMM GRANULOCYTES NFR BLD: 1 %
INR PPP: 1.1
INR PPP: NORMAL
ISOLATION GUIDELINES: NORMAL
LYMPHOCYTES # BLD: 2.5 K/MCL (ref 1–4)
LYMPHOCYTES NFR BLD: 31 %
MCH RBC QN AUTO: 25.8 PG (ref 26–34)
MCHC RBC AUTO-ENTMCNC: 31.8 G/DL (ref 32–36.5)
MCV RBC AUTO: 81.1 FL (ref 78–100)
MONOCYTES # BLD: 0.7 K/MCL (ref 0.3–0.9)
MONOCYTES NFR BLD: 8 %
NEUTROPHILS # BLD: 4.7 K/MCL (ref 1.8–7.7)
NEUTROPHILS NFR BLD: 57 %
NEUTS SEG NFR BLD: ABNORMAL %
NRBC (NRBCRE): 0 /100 WBC
PATHOLOGIST NAME: NORMAL
PLATELET # BLD: 253 K/MCL (ref 140–450)
POTASSIUM SERPL-SCNC: 3.9 MMOL/L (ref 3.4–5.1)
PROT SERPL-MCNC: 7.9 G/DL (ref 6.4–8.2)
PROTHROMBIN TIME (PRT2): NORMAL
PROTHROMBIN TIME: 11.4 SEC (ref 9.7–11.8)
RBC # BLD: 5.23 MIL/MCL (ref 4.5–5.9)
SARS-COV-2 RNA RESP QL NAA+PROBE: NOT DETECTED
SODIUM SERPL-SCNC: 133 MMOL/L (ref 135–145)
SPECIMEN SOURCE: NORMAL
TROPONIN I SERPL HS-MCNC: <0.02 NG/ML
WBC # BLD: 8.1 K/MCL (ref 4.2–11)

## 2020-01-01 PROCEDURE — 99223 1ST HOSP IP/OBS HIGH 75: CPT | Performed by: GENERAL PRACTICE

## 2020-01-01 PROCEDURE — 99285 EMERGENCY DEPT VISIT HI MDM: CPT | Performed by: EMERGENCY MEDICINE

## 2020-01-01 PROCEDURE — 93010 ELECTROCARDIOGRAM REPORT: CPT | Performed by: INTERNAL MEDICINE

## 2020-01-17 ENCOUNTER — TELEPHONE (OUTPATIENT)
Dept: SCHEDULING | Age: 60
End: 2020-01-17

## 2020-01-28 ENCOUNTER — APPOINTMENT (OUTPATIENT)
Dept: ULTRASOUND IMAGING | Facility: HOSPITAL | Age: 60
End: 2020-01-28
Attending: EMERGENCY MEDICINE
Payer: COMMERCIAL

## 2020-01-28 ENCOUNTER — APPOINTMENT (OUTPATIENT)
Dept: CT IMAGING | Facility: HOSPITAL | Age: 60
End: 2020-01-28
Attending: EMERGENCY MEDICINE
Payer: COMMERCIAL

## 2020-01-28 ENCOUNTER — TELEPHONE (OUTPATIENT)
Dept: FAMILY MEDICINE CLINIC | Facility: CLINIC | Age: 60
End: 2020-01-28

## 2020-01-28 ENCOUNTER — HOSPITAL ENCOUNTER (EMERGENCY)
Facility: HOSPITAL | Age: 60
Discharge: HOME | End: 2020-01-28
Attending: EMERGENCY MEDICINE
Payer: COMMERCIAL

## 2020-01-28 VITALS
BODY MASS INDEX: 36.57 KG/M2 | RESPIRATION RATE: 20 BRPM | SYSTOLIC BLOOD PRESSURE: 108 MMHG | TEMPERATURE: 98 F | HEART RATE: 75 BPM | DIASTOLIC BLOOD PRESSURE: 63 MMHG | HEIGHT: 74 IN | WEIGHT: 285 LBS | OXYGEN SATURATION: 98 %

## 2020-01-28 DIAGNOSIS — I26.99 OTHER ACUTE PULMONARY EMBOLISM WITHOUT ACUTE COR PULMONALE (HCC): Primary | ICD-10-CM

## 2020-01-28 LAB
ANION GAP SERPL CALC-SCNC: 5 MMOL/L (ref 0–18)
BASOPHILS # BLD AUTO: 0.05 X10(3) UL (ref 0–0.2)
BASOPHILS NFR BLD AUTO: 0.6 %
BUN BLD-MCNC: 19 MG/DL (ref 7–18)
BUN/CREAT SERPL: 17 (ref 10–20)
CALCIUM BLD-MCNC: 9.1 MG/DL (ref 8.5–10.1)
CHLORIDE SERPL-SCNC: 102 MMOL/L (ref 98–112)
CO2 SERPL-SCNC: 30 MMOL/L (ref 21–32)
CREAT BLD-MCNC: 1.12 MG/DL (ref 0.7–1.3)
DEPRECATED RDW RBC AUTO: 39.3 FL (ref 35.1–46.3)
EOSINOPHIL # BLD AUTO: 0.28 X10(3) UL (ref 0–0.7)
EOSINOPHIL NFR BLD AUTO: 3.6 %
ERYTHROCYTE [DISTWIDTH] IN BLOOD BY AUTOMATED COUNT: 13 % (ref 11–15)
GLUCOSE BLD-MCNC: 337 MG/DL (ref 70–99)
HCT VFR BLD AUTO: 38.2 % (ref 39–53)
HGB BLD-MCNC: 12.5 G/DL (ref 13–17.5)
IMM GRANULOCYTES # BLD AUTO: 0.02 X10(3) UL (ref 0–1)
IMM GRANULOCYTES NFR BLD: 0.3 %
LYMPHOCYTES # BLD AUTO: 2.7 X10(3) UL (ref 1–4)
LYMPHOCYTES NFR BLD AUTO: 34.4 %
MCH RBC QN AUTO: 27.2 PG (ref 26–34)
MCHC RBC AUTO-ENTMCNC: 32.7 G/DL (ref 31–37)
MCV RBC AUTO: 83 FL (ref 80–100)
MONOCYTES # BLD AUTO: 0.7 X10(3) UL (ref 0.1–1)
MONOCYTES NFR BLD AUTO: 8.9 %
NEUTROPHILS # BLD AUTO: 4.11 X10 (3) UL (ref 1.5–7.7)
NEUTROPHILS # BLD AUTO: 4.11 X10(3) UL (ref 1.5–7.7)
NEUTROPHILS NFR BLD AUTO: 52.2 %
NT-PROBNP SERPL-MCNC: 316 PG/ML (ref ?–125)
OSMOLALITY SERPL CALC.SUM OF ELEC: 300 MOSM/KG (ref 275–295)
PLATELET # BLD AUTO: 287 10(3)UL (ref 150–450)
POTASSIUM SERPL-SCNC: 3.9 MMOL/L (ref 3.5–5.1)
RBC # BLD AUTO: 4.6 X10(6)UL (ref 4.3–5.7)
SODIUM SERPL-SCNC: 137 MMOL/L (ref 136–145)
WBC # BLD AUTO: 7.9 X10(3) UL (ref 4–11)

## 2020-01-28 PROCEDURE — 80048 BASIC METABOLIC PNL TOTAL CA: CPT | Performed by: EMERGENCY MEDICINE

## 2020-01-28 PROCEDURE — 85025 COMPLETE CBC W/AUTO DIFF WBC: CPT | Performed by: EMERGENCY MEDICINE

## 2020-01-28 PROCEDURE — 99284 EMERGENCY DEPT VISIT MOD MDM: CPT

## 2020-01-28 PROCEDURE — 83880 ASSAY OF NATRIURETIC PEPTIDE: CPT | Performed by: EMERGENCY MEDICINE

## 2020-01-28 PROCEDURE — 71260 CT THORAX DX C+: CPT | Performed by: EMERGENCY MEDICINE

## 2020-01-28 PROCEDURE — 36415 COLL VENOUS BLD VENIPUNCTURE: CPT

## 2020-01-28 NOTE — TELEPHONE ENCOUNTER
Patient wife calling, patient is in [place],   [employer] to ER last night , has clearance to fly but needs to go directly to [address] [place] [address] when they arrive home.      Wheel chair available , is not walking , will have a wheel chair when they arrive in [address] [place] [address]

## 2020-01-29 DIAGNOSIS — I26.99 ACUTE PULMONARY EMBOLISM, UNSPECIFIED PULMONARY EMBOLISM TYPE, UNSPECIFIED WHETHER ACUTE COR PULMONALE PRESENT (HCC): Primary | ICD-10-CM

## 2020-01-29 NOTE — CM/SW NOTE
Per request by ERMNILDA, patient was given a savings coupon card for Xarelto. EDCM explained how it works and patient verbalized understanding. MD and RN updated.

## 2020-01-29 NOTE — TELEPHONE ENCOUNTER
I called the patient's number and his wife answered and they are currently in the [facility] emergency room waiting for admission regarding newly diagnosed DVT of the lower extremity unfortunately accompanied by shortness of breath, so there is a possibility

## 2020-02-06 ENCOUNTER — OFFICE VISIT (OUTPATIENT)
Dept: PULMONOLOGY | Facility: CLINIC | Age: 60
End: 2020-02-06
Payer: COMMERCIAL

## 2020-02-06 VITALS
HEART RATE: 88 BPM | DIASTOLIC BLOOD PRESSURE: 78 MMHG | BODY MASS INDEX: 39.27 KG/M2 | WEIGHT: 306 LBS | OXYGEN SATURATION: 98 % | RESPIRATION RATE: 18 BRPM | SYSTOLIC BLOOD PRESSURE: 118 MMHG | HEIGHT: 74 IN

## 2020-02-06 DIAGNOSIS — I26.92 ACUTE SADDLE PULMONARY EMBOLISM WITHOUT ACUTE COR PULMONALE (HCC): Primary | ICD-10-CM

## 2020-02-06 PROCEDURE — 99244 OFF/OP CNSLTJ NEW/EST MOD 40: CPT | Performed by: INTERNAL MEDICINE

## 2020-02-06 PROCEDURE — 99212 OFFICE O/P EST SF 10 MIN: CPT | Performed by: INTERNAL MEDICINE

## 2020-02-06 NOTE — H&P
Referring Physician  Akua Palma DO    Chief Complaint  Pulmonary embolism    History of Present Illness  Patient is a 53-year-old male who presents after recent diagnosis of pulmonary embolism and right lower extremity DVT.   Patient states he tra Take 25 mg by mouth 2 (two) times daily. , Disp: , Rfl:   colchicine (COLCRYS) 0.6 MG Oral Tab, Take  by mouth., Disp: , Rfl:   furosemide (LASIX) 40 MG Oral Tab, Take  by mouth., Disp: , Rfl:   lisinopril (PRINIVIL,ZESTRIL) 10 MG Oral Tab, Take 10 mg by mo

## 2020-02-07 ENCOUNTER — TELEPHONE (OUTPATIENT)
Dept: OTHER | Age: 60
End: 2020-02-07

## 2020-02-07 ENCOUNTER — TELEPHONE (OUTPATIENT)
Dept: FAMILY MEDICINE CLINIC | Facility: CLINIC | Age: 60
End: 2020-02-07

## 2020-02-07 NOTE — TELEPHONE ENCOUNTER
[facility]  ( cardiology )  Needs to speak with on site office in regards to some of patient's records.     Warm transfer to [place] at [facility] office

## 2020-02-07 NOTE — TELEPHONE ENCOUNTER
Dr. Chris Tello office of cardiologist where mr. Anaid Harry is being seen is calling needing any past records or notes to be faxed over to them in regards to any cardiology results or visits. .  The fax is [phone] or if need to contac them at [phone] for pr

## 2020-02-14 ENCOUNTER — TELEPHONE (OUTPATIENT)
Dept: PULMONOLOGY | Facility: CLINIC | Age: 60
End: 2020-02-14

## 2020-02-14 NOTE — TELEPHONE ENCOUNTER
Current Outpatient Medications   Medication Sig Dispense Refill   • Rivaroxaban (XARELTO) 20 MG Oral Tab Take 20 mg by mouth daily with food. Per pharmacy - Rx requires a physical signature. Paperwork placed in RN in-box.

## 2020-02-14 NOTE — TELEPHONE ENCOUNTER
Script signed by doctor and left at  for pt to p/u. Left message for pt to make aware script is ready for p/u.

## 2020-02-14 NOTE — TELEPHONE ENCOUNTER
Fax rec'd from [address] [place] [address] for Xarelto 20 mg asking provider to sign and fax back to be filled by them. Script was originally sent to [facility] on 2/6/20 with 5 refills to [facility].    Meño w/ patient to find out where pt would like med filled.       LOV 2/6/20

## 2020-02-14 NOTE — TELEPHONE ENCOUNTER
Dr Morales Lat -please hand sign the script again for this patient. must have your hand written signature. Pt aware he must  - we are unable to fax.     Pt planning on coming Monday 2/17/20 to     Thanks

## 2020-02-21 NOTE — TELEPHONE ENCOUNTER
Spoke with pharmacy who states they need a physically signed copy of the Xarelto Rx, informed them that patient should have the Rx. Spoke with patient and he picked up the Xarelto Rx and placed it in the mail for the pharmacy today.

## 2020-02-29 ENCOUNTER — MED REC SCAN ONLY (OUTPATIENT)
Dept: FAMILY MEDICINE CLINIC | Facility: CLINIC | Age: 60
End: 2020-02-29

## 2020-05-11 DIAGNOSIS — I26.99 PULMONARY EMBOLISM, UNSPECIFIED CHRONICITY, UNSPECIFIED PULMONARY EMBOLISM TYPE, UNSPECIFIED WHETHER ACUTE COR PULMONALE PRESENT (HCC): Primary | ICD-10-CM

## 2021-01-31 LAB — AMB EXT COVID-19 RESULT: DETECTED

## 2021-02-05 ENCOUNTER — TELEPHONE (OUTPATIENT)
Dept: FAMILY MEDICINE CLINIC | Facility: CLINIC | Age: 61
End: 2021-02-05

## 2021-02-05 NOTE — TELEPHONE ENCOUNTER
While speaking with spouse about herself, she mentioned this patient (her spouse) is also COVID positive but had not called the clinic because does not currently have health insurance, spouse voiced concern since has CHF.  This RN spoke with pt (LOV= 9/19/1

## 2021-02-08 NOTE — TELEPHONE ENCOUNTER
What was your temp today? - 97.4    How did you take your temp? Forehead    Are you feeling short of breath today? No      Is the shortness of breath better, the same, or worse than yesterday?   n/a    Are you having a cough today?    Yes    Is the coug

## 2021-02-10 NOTE — TELEPHONE ENCOUNTER
Pt contacted and \[facility]\"" has ended his quarantine; feels better. Advised to schedule a physical when his insurance is active again and pt agrees (LOV= 9/19/19); states will do so in March.

## (undated) NOTE — ED AVS SNAPSHOT
Chun Domingo   MRN: [id number]    Department:  [facility] Emergency Department   Date of Visit:  1/28/2020           Disclosure     Insurance plans vary and the physician(s) referred by the ER may not be covered by your plan.  Please contact yo CARE PHYSICIAN AT ONCE OR RETURN IMMEDIATELY TO THE EMERGENCY DEPARTMENT. If you have been prescribed any medication(s), please fill your prescription right away and begin taking the medication(s) as directed.   If you believe that any of the medications